# Patient Record
(demographics unavailable — no encounter records)

---

## 2024-12-11 NOTE — ASSESSMENT
[FreeTextEntry1] : Revisited SPTube discussion with patient and his wife He continues to decline He will continue with [facility] for atonic bladder and overnight Jewell catheters (which he is reluctant to do also) Continue bladder irrigations more diligently  Renewed: Hiprex and Vit C and Nitrofurantoin Catheter and irrigations supplies renewed also

## 2024-12-11 NOTE — HISTORY OF PRESENT ILLNESS
[FreeTextEntry1] : Pt is here for follow up, accompanied by his wife he was recently hospitalized in April for UTI and right sided abdominal pain CT demonstrated air in right collecting system with mod-severe distention of interposed ileal loop/R renal collecting system. Nondependent air within the urinary bladder and mild bladder distention. He was treated with an indwelling Jewell catheter and IV Ertapenem and completed 10 days course.   He presents today for follow up he has been taking the suppressive Hiprex, nitrofurantoin and vit C  He still has right sided pain on and off He physically looks better compared to last visit  He still refuses to have SPTube placed to better drain his kidneys and bladder  renal ultrasound today: Bilateral hydronephrosis, mobile debris in collecting system, distended ileal ureter, full bladder, enlarged prostate, stable renal stones